# Patient Record
Sex: MALE | Race: WHITE | URBAN - METROPOLITAN AREA
[De-identification: names, ages, dates, MRNs, and addresses within clinical notes are randomized per-mention and may not be internally consistent; named-entity substitution may affect disease eponyms.]

---

## 2017-01-06 ENCOUNTER — TELEPHONE (OUTPATIENT)
Dept: CARDIOLOGY CLINIC | Age: 52
End: 2017-01-06

## 2017-01-06 RX ORDER — METOPROLOL SUCCINATE 25 MG/1
75 TABLET, EXTENDED RELEASE ORAL DAILY
COMMUNITY
End: 2017-01-06 | Stop reason: SDUPTHER

## 2017-01-06 RX ORDER — METOPROLOL SUCCINATE 25 MG/1
75 TABLET, EXTENDED RELEASE ORAL DAILY
Qty: 90 TABLET | Refills: 5 | Status: SHIPPED | OUTPATIENT
Start: 2017-01-06 | End: 2017-07-10 | Stop reason: SDUPTHER

## 2017-06-19 ENCOUNTER — OFFICE VISIT (OUTPATIENT)
Dept: CARDIOLOGY CLINIC | Age: 52
End: 2017-06-19

## 2017-06-19 VITALS
WEIGHT: 224 LBS | OXYGEN SATURATION: 98 % | SYSTOLIC BLOOD PRESSURE: 130 MMHG | DIASTOLIC BLOOD PRESSURE: 80 MMHG | BODY MASS INDEX: 31.36 KG/M2 | HEIGHT: 71 IN | HEART RATE: 70 BPM

## 2017-06-19 DIAGNOSIS — I49.3 PVC'S (PREMATURE VENTRICULAR CONTRACTIONS): Primary | ICD-10-CM

## 2017-06-19 DIAGNOSIS — E11.9 TYPE 2 DIABETES MELLITUS WITHOUT COMPLICATION, WITHOUT LONG-TERM CURRENT USE OF INSULIN (HCC): ICD-10-CM

## 2017-06-19 DIAGNOSIS — R00.2 PALPITATIONS: ICD-10-CM

## 2017-06-19 DIAGNOSIS — E78.00 PURE HYPERCHOLESTEROLEMIA: ICD-10-CM

## 2017-06-19 DIAGNOSIS — I10 BENIGN ESSENTIAL HTN: ICD-10-CM

## 2017-06-19 PROCEDURE — 99214 OFFICE O/P EST MOD 30 MIN: CPT | Performed by: INTERNAL MEDICINE

## 2017-06-19 RX ORDER — ATORVASTATIN CALCIUM 40 MG/1
40 TABLET, FILM COATED ORAL EVERY EVENING
COMMUNITY
Start: 2017-05-31

## 2017-06-19 ASSESSMENT — ENCOUNTER SYMPTOMS
WHEEZING: 0
SHORTNESS OF BREATH: 0
ABDOMINAL DISTENTION: 0
BLOOD IN STOOL: 0
EYE REDNESS: 0
COUGH: 0

## 2017-07-10 RX ORDER — METOPROLOL SUCCINATE 25 MG/1
TABLET, EXTENDED RELEASE ORAL
Qty: 90 TABLET | Refills: 5 | Status: SHIPPED | OUTPATIENT
Start: 2017-07-10 | End: 2018-01-10 | Stop reason: SDUPTHER

## 2018-01-11 RX ORDER — METOPROLOL SUCCINATE 25 MG/1
TABLET, EXTENDED RELEASE ORAL
Qty: 180 TABLET | Refills: 3 | Status: SHIPPED | OUTPATIENT
Start: 2018-01-11 | End: 2018-01-22

## 2018-01-22 ENCOUNTER — OFFICE VISIT (OUTPATIENT)
Dept: CARDIOLOGY CLINIC | Age: 53
End: 2018-01-22

## 2018-01-22 VITALS
DIASTOLIC BLOOD PRESSURE: 100 MMHG | SYSTOLIC BLOOD PRESSURE: 142 MMHG | HEART RATE: 68 BPM | OXYGEN SATURATION: 96 % | WEIGHT: 217 LBS | HEIGHT: 70 IN | BODY MASS INDEX: 31.07 KG/M2

## 2018-01-22 DIAGNOSIS — R00.2 PALPITATIONS: Primary | ICD-10-CM

## 2018-01-22 DIAGNOSIS — I49.3 PVC'S (PREMATURE VENTRICULAR CONTRACTIONS): ICD-10-CM

## 2018-01-22 DIAGNOSIS — E78.00 PURE HYPERCHOLESTEROLEMIA: ICD-10-CM

## 2018-01-22 PROCEDURE — 99214 OFFICE O/P EST MOD 30 MIN: CPT | Performed by: INTERNAL MEDICINE

## 2018-01-22 RX ORDER — METOPROLOL SUCCINATE 100 MG/1
100 TABLET, EXTENDED RELEASE ORAL DAILY
Qty: 30 TABLET | Refills: 6 | Status: SHIPPED | OUTPATIENT
Start: 2018-01-22 | End: 2018-08-06 | Stop reason: SDUPTHER

## 2018-01-22 RX ORDER — LISINOPRIL 5 MG/1
5 TABLET ORAL DAILY
Qty: 30 TABLET | Refills: 3 | Status: CANCELLED | OUTPATIENT
Start: 2018-01-22

## 2018-01-22 ASSESSMENT — ENCOUNTER SYMPTOMS
BLOOD IN STOOL: 0
ABDOMINAL DISTENTION: 0
SHORTNESS OF BREATH: 1
COUGH: 0
EYE REDNESS: 0
WHEEZING: 0

## 2018-01-22 NOTE — PATIENT INSTRUCTIONS
your doctor if you think you are having a problem with your medicine. When should you call for help? Call 911 anytime you think you may need emergency care. For example, call if:  ? · You passed out (lost consciousness). ? · You have symptoms of a heart attack. These may include:  ¨ Chest pain or pressure, or a strange feeling in the chest.  ¨ Sweating. ¨ Shortness of breath. ¨ Pain, pressure, or a strange feeling in the back, neck, jaw, or upper belly or in one or both shoulders or arms. ¨ Lightheadedness or sudden weakness. ¨ A fast or irregular heartbeat. After you call 911, the  may tell you to chew 1 adult-strength or 2 to 4 low-dose aspirin. Wait for an ambulance. Do not try to drive yourself. ? · You have symptoms of a stroke. These may include:  ¨ Sudden numbness, tingling, weakness, or loss of movement in your face, arm, or leg, especially on only one side of your body. ¨ Sudden vision changes. ¨ Sudden trouble speaking. ¨ Sudden confusion or trouble understanding simple statements. ¨ Sudden problems with walking or balance. ¨ A sudden, severe headache that is different from past headaches. ?Call your doctor now or seek immediate medical care if:  ? · You have heart palpitations and:  ¨ Are dizzy or lightheaded, or you feel like you may faint. ¨ Have new or increased shortness of breath. ? Watch closely for changes in your health, and be sure to contact your doctor if:  ? · You continue to have heart palpitations. Where can you learn more? Go to https://TATE'S LISTgeorge.Pijon. org and sign in to your Whotever account. Enter R508 in the [facility] box to learn more about \"Palpitations: Care Instructions. \"     If you do not have an account, please click on the \"Sign Up Now\" link. Current as of: September 21, 2016  Content Version: 11.5  © 9585-2030 Healthwise, Incorporated. Care instructions adapted under license by [facility] ([facility]).  If you have questions about a

## 2018-01-22 NOTE — PROGRESS NOTES
Subjective:      Patient ID: Corbin Roman is a 47 y.o. male. Reason for visit: f/u palpitations  CC: \"I have palpitations and SOB\"    HPI Corbin Roman denies chest pain, dizziness, syncope, leg swelling, worsening BLAIR and cough. He reports worsening palpitations in the afternoons. Review of Systems   Constitutional: Negative for activity change, appetite change, chills, fatigue, fever and unexpected weight change. HENT: Negative for congestion, nosebleeds and tinnitus. Eyes: Negative for redness and visual disturbance. Respiratory: Positive for shortness of breath. Negative for cough and wheezing. Cardiovascular: Positive for palpitations. Negative for chest pain and leg swelling. Gastrointestinal: Negative for abdominal distention and blood in stool. Genitourinary: Negative for dysuria and hematuria. Musculoskeletal: Negative for gait problem and myalgias. Neurological: Negative for dizziness and speech difficulty. Hematological: Does not bruise/bleed easily. Psychiatric/Behavioral: Negative for behavioral problems and confusion. All other systems reviewed and are negative. Objective:   Physical Exam   Constitutional: He is oriented to person, place, and time. He appears well-developed and well-nourished. HENT:   Head: Normocephalic and atraumatic. Eyes: Conjunctivae are normal. Right eye exhibits no discharge. Left eye exhibits no discharge. Neck: Normal range of motion. Neck supple. Cardiovascular: Normal rate, regular rhythm, normal heart sounds and intact distal pulses. ectopy   Pulmonary/Chest: Effort normal and breath sounds normal.   Abdominal: Soft. Bowel sounds are normal.   Musculoskeletal: Normal range of motion. He exhibits no edema. Neurological: He is alert and oriented to person, place, and time. Skin: Skin is warm and dry. Psychiatric: He has a normal mood and affect. His behavior is normal.   Nursing note and vitals reviewed.     Blood pressure Remigio Wooten ) 142/100, pulse 68, height 5' 10\" (1.778 m), weight 217 lb (98.4 kg), SpO2 96 %. Vitals:    01/22/18 1456   BP: (!) 142/100   Pulse: 68   SpO2: 96%   Weight: 217 lb (98.4 kg)   Height: 5' 10\" (1.778 m)     Body mass index is 31.14 kg/m². Wt Readings from Last 3 Encounters:   01/22/18 217 lb (98.4 kg)   06/19/17 224 lb (101.6 kg)   12/12/16 218 lb (98.9 kg)     BP Readings from Last 3 Encounters:   01/22/18 (!) 142/100   06/19/17 130/80   12/12/16 130/88        Current Outpatient Prescriptions   Medication Sig Dispense Refill    Dapagliflozin-Metformin HCl ER (XIGDUO XR)  MG TB24 Take by mouth      metoprolol succinate (TOPROL XL) 25 MG extended release tablet Take 2 tablets by mouth every morning and 1 tablet every evening 180 tablet 3    atorvastatin (LIPITOR) 40 MG tablet Take 40 mg by mouth every evening      metFORMIN (GLUCOPHAGE-XR) 750 MG extended release tablet Take 750 mg by mouth daily (with breakfast)       No current facility-administered medications for this visit. Social History     Social History    Marital status:      Spouse name: N/A    Number of children: N/A    Years of education: N/A     Social History Main Topics    Smoking status: Former Smoker     Packs/day: 0.25     Years: 2.00     Types: Cigarettes     Quit date: 10/25/2009    Smokeless tobacco: Never Used    Alcohol use No    Drug use: No    Sexual activity: Not Asked     Other Topics Concern    None     Social History Narrative    None     History reviewed. No pertinent surgical history. No Known Allergies  Family History   Problem Relation Age of Onset    Cancer Mother     High Blood Pressure Maternal Aunt     Diabetes Maternal Aunt        Recent labs and imaging reviewed. Assessment:            Neck pain associated with palpitations. Plain GXT showed no ischemia.       Diabetes ([address]- managed by PCP     Erectile dysfunction     HLD, , managed by PCP     Palpitations      GXT 10/11/16 no ischemia, isolated symptomatic PAC's and PVC's. Event recorder 11/2016 NSR, PVC's and PAC's- isolated which are symptomatic. K+ level normal. Taking BB. HTN - Uncontrolled. GERD. Hx          Plan:      In view of palpitations and uncontrolled HTN, will changed to Toprol XL 100mg daily and continue to monitor BP. Consider ACE in view of DM. No angina or clinical evidence of CHF.

## 2018-08-03 ASSESSMENT — ENCOUNTER SYMPTOMS
BLOOD IN STOOL: 0
ABDOMINAL DISTENTION: 0
EYE REDNESS: 0
WHEEZING: 0
SHORTNESS OF BREATH: 0
COUGH: 0

## 2018-08-03 NOTE — PROGRESS NOTES
Hx    Plan    Clinically stable. HTN controlled. Palpitations improved. No evidence of CHF. No angina. Continue statin and BB. Risk factor modification also discussed. Return in about 1 year (around 8/6/2019).

## 2018-08-06 ENCOUNTER — OFFICE VISIT (OUTPATIENT)
Dept: CARDIOLOGY CLINIC | Age: 53
End: 2018-08-06

## 2018-08-06 VITALS
OXYGEN SATURATION: 97 % | DIASTOLIC BLOOD PRESSURE: 82 MMHG | HEART RATE: 64 BPM | BODY MASS INDEX: 29.78 KG/M2 | WEIGHT: 208 LBS | HEIGHT: 70 IN | SYSTOLIC BLOOD PRESSURE: 116 MMHG

## 2018-08-06 DIAGNOSIS — I49.3 PVC'S (PREMATURE VENTRICULAR CONTRACTIONS): Primary | ICD-10-CM

## 2018-08-06 DIAGNOSIS — R00.2 PALPITATIONS: ICD-10-CM

## 2018-08-06 DIAGNOSIS — E78.00 PURE HYPERCHOLESTEROLEMIA: ICD-10-CM

## 2018-08-06 PROCEDURE — 99214 OFFICE O/P EST MOD 30 MIN: CPT | Performed by: INTERNAL MEDICINE

## 2018-08-06 RX ORDER — METOPROLOL SUCCINATE 100 MG/1
100 TABLET, EXTENDED RELEASE ORAL DAILY
Qty: 90 TABLET | Refills: 3 | Status: SHIPPED | OUTPATIENT
Start: 2018-08-06 | End: 2018-08-30 | Stop reason: SDUPTHER

## 2018-08-06 NOTE — COMMUNICATION BODY
Assessment:       Plan:     HPI Jairo Proctor denies chest pain, dizziness, syncope, leg swelling and increased dyspnea. He is exercising regularly and watching his diet. He has only rare palpitations. Assessment          Neck pain associated with palpitations. Plain GXT showed no ischemia.  Diabetes ([facility] [address]- managed by PCP     Erectile dysfunction     HLD, LDL 78 5/2018, managed by PCP     Palpitations      GXT 10/11/16 no ischemia, isolated symptomatic PAC's and PVC's. Event recorder 11/2016 NSR, PVC's and PAC's- isolated which are symptomatic. K+ level normal.         HTN - controlled. GERD. Hx    Plan    Clinically stable. HTN controlled. Palpitations improved. No evidence of CHF. No angina. Continue statin and BB. Risk factor modification also discussed. Return in about 1 year (around 8/6/2019).

## 2018-08-06 NOTE — LETTER
[address] Cardiology - [address] [address]  [address]   [address] [address]  Phone: [phone]  Fax: [phone]    Nelson Reese MD        August 6, 2018     Jorge Langston MD  [facility], [address]  [address] [facility]    Patient: Dayton Fischer  MR Number: [id number]  YOB: 1965  Date of Visit: 8/6/2018    Dear Dr. Jorge Langston:    HPI Dayton Fischer denies chest pain, dizziness, syncope, leg swelling and increased dyspnea. He is exercising regularly and watching his diet. He has only rare palpitations. Assessment          Neck pain associated with palpitations. Plain GXT showed no ischemia.  Diabetes ([address]- managed by PCP     Erectile dysfunction     HLD, LDL 78 5/2018, managed by PCP     Palpitations      GXT 10/11/16 no ischemia, isolated symptomatic PAC's and PVC's. Event recorder 11/2016 NSR, PVC's and PAC's- isolated which are symptomatic. K+ level normal.         HTN - controlled. GERD. Hx    Plan    Clinically stable. HTN controlled. Palpitations improved. No evidence of CHF. No angina. Continue statin and BB. Risk factor modification also discussed. Return in about 1 year (around 8/6/2019). If you have questions, please do not hesitate to call me. I look forward to following Blaze along with you.     Sincerely,        Nelson Reese MD

## 2018-08-30 RX ORDER — METOPROLOL SUCCINATE 100 MG/1
100 TABLET, EXTENDED RELEASE ORAL DAILY
Qty: 90 TABLET | Refills: 3 | Status: SHIPPED | OUTPATIENT
Start: 2018-08-30 | End: 2019-08-07 | Stop reason: SDUPTHER

## 2019-08-07 RX ORDER — METOPROLOL SUCCINATE 100 MG/1
100 TABLET, EXTENDED RELEASE ORAL DAILY
Qty: 90 TABLET | Refills: 0 | Status: SHIPPED | OUTPATIENT
Start: 2019-08-07 | End: 2019-11-13 | Stop reason: SDUPTHER

## 2019-11-13 RX ORDER — METOPROLOL SUCCINATE 100 MG/1
100 TABLET, EXTENDED RELEASE ORAL DAILY
Qty: 30 TABLET | Refills: 0 | Status: SHIPPED | OUTPATIENT
Start: 2019-11-13 | End: 2019-12-18 | Stop reason: SDUPTHER

## 2019-12-19 RX ORDER — METOPROLOL SUCCINATE 100 MG/1
100 TABLET, EXTENDED RELEASE ORAL DAILY
Qty: 90 TABLET | Refills: 3 | Status: SHIPPED | OUTPATIENT
Start: 2019-12-19 | End: 2020-01-31 | Stop reason: SDUPTHER

## 2019-12-19 RX ORDER — METOPROLOL SUCCINATE 100 MG/1
100 TABLET, EXTENDED RELEASE ORAL DAILY
Qty: 30 TABLET | Refills: 0 | Status: SHIPPED | OUTPATIENT
Start: 2019-12-19 | End: 2019-12-19 | Stop reason: SDUPTHER

## 2020-01-09 ENCOUNTER — OFFICE VISIT (OUTPATIENT)
Dept: CARDIOLOGY CLINIC | Age: 55
End: 2020-01-09
Payer: COMMERCIAL

## 2020-01-09 VITALS
BODY MASS INDEX: 30.09 KG/M2 | SYSTOLIC BLOOD PRESSURE: 150 MMHG | DIASTOLIC BLOOD PRESSURE: 95 MMHG | HEIGHT: 70 IN | OXYGEN SATURATION: 98 % | WEIGHT: 210.2 LBS | HEART RATE: 64 BPM

## 2020-01-09 PROCEDURE — 93000 ELECTROCARDIOGRAM COMPLETE: CPT | Performed by: INTERNAL MEDICINE

## 2020-01-09 PROCEDURE — 99214 OFFICE O/P EST MOD 30 MIN: CPT | Performed by: INTERNAL MEDICINE

## 2020-01-09 RX ORDER — LOSARTAN POTASSIUM 25 MG/1
25 TABLET ORAL DAILY
Qty: 90 TABLET | Refills: 3 | Status: SHIPPED | OUTPATIENT
Start: 2020-01-09 | End: 2020-12-30 | Stop reason: SDUPTHER

## 2020-01-09 ASSESSMENT — ENCOUNTER SYMPTOMS
COUGH: 0
SHORTNESS OF BREATH: 0
BLOOD IN STOOL: 0
ABDOMINAL DISTENTION: 0
WHEEZING: 0
EYE REDNESS: 0

## 2020-01-09 NOTE — PROGRESS NOTES
Kaya Basurto is a 49 y.o. male    Reason for Visit: f/u palpitations    HPI Kaya Basurto denies chest pain, dizziness, syncope, worsening right leg swelling and increased dyspnea. He rarely has palpitations. He states that his BP was high at the dentist today. Review of Systems   Constitutional: Negative for activity change, appetite change, chills, fatigue, fever and unexpected weight change. HENT: Negative for congestion, nosebleeds and tinnitus. Eyes: Negative for redness and visual disturbance. Respiratory: Negative for cough, shortness of breath and wheezing. Cardiovascular: Negative for chest pain, palpitations and leg swelling. Gastrointestinal: Negative for abdominal distention and blood in stool. Genitourinary: Negative for dysuria and hematuria. Musculoskeletal: Negative for gait problem and myalgias. Neurological: Negative for dizziness and speech difficulty. Hematological: Does not bruise/bleed easily. Psychiatric/Behavioral: Negative for behavioral problems and confusion. All other systems reviewed and are negative. Physical Exam   Constitutional: He is oriented to person, place, and time. He appears well-developed and well-nourished. HENT:   Head: Normocephalic and atraumatic. Eyes: Conjunctivae are normal. Right eye exhibits no discharge. Left eye exhibits no discharge. Neck: Normal range of motion. Neck supple. Cardiovascular: Normal rate, regular rhythm, normal heart sounds and intact distal pulses. Pulmonary/Chest: Effort normal and breath sounds normal.   Abdominal: Soft. Bowel sounds are normal.   Musculoskeletal: Normal range of motion. General: Edema (mild RLE) present. Neurological: He is alert and oriented to person, place, and time. Skin: Skin is warm and dry. Psychiatric: He has a normal mood and affect. His behavior is normal.   Nursing note and vitals reviewed.       Wt Readings from Last 3 Encounters:   01/09/20 210 lb 3.2 oz current or ex partner: None     Emotionally abused: None     Physically abused: None     Forced sexual activity: None   Other Topics Concern    None   Social History Narrative    None       History reviewed. No pertinent surgical history. Family History   Problem Relation Age of Onset    Cancer Mother     High Blood Pressure Maternal Aunt     Diabetes Maternal Aunt        No Known Allergies    Recent Labs and Imaging reviewed    Assessment          Neck pain associated with palpitations. Plain GXT showed no ischemia.  Diabetes ([address]- managed by PCP     Erectile dysfunction     HLD, LDL 78 5/2018, managed by PCP     Palpitations      GXT 10/11/16 no ischemia, isolated symptomatic PAC's and PVC's. Event recorder 11/2016 NSR, PVC's and PAC's- isolated which are symptomatic. K+ level normal. EKG 1/2020 SB. HTN - uncontrolled. GERD. Hx    Plan     No evidence of CHF. No angina. In view of HTN and DM, will add cozaar 25mg daily. BMP in 1 month. EKG today confirms SB. Continue BB in view of palpitations. Ongoing risk factor modification also discussed. Return in about 1 year (around 1/9/2021). This note was scribed in the presence of the physician by Mark Tarango RN. The scribes documentation has been prepared under my direction and personally reviewed by me in its entirety. I confirm that the note above accurately reflects all work, treatment, procedures, and medical decision making performed by me.

## 2020-01-09 NOTE — LETTER
[address] [facility]iology [facility]  [address]  Phone: [phone]  Fax: [phone]    Aishwarya Dasilva MD        January 24, 2020     Dustin Duke MD  [facility], [address]  [address] [facility]    Patient: Stuart Holland  MR Number: <[id number]>  YOB: 1965  Date of Visit: 1/9/2020    Dear Dr. Dustin Duke: Thank you for your referral. Progress note attached in visit summary. If you have questions, please do not hesitate to call me. I look forward to following Blaze along with you.     Sincerely,        Aishwarya Dasilva MD

## 2020-01-09 NOTE — PATIENT INSTRUCTIONS
Patient Education      START COZAAR 25MG DAILY  High Blood Pressure: Care Instructions  Overview    It's normal for blood pressure to go up and down throughout the day. But if it stays up, you have high blood pressure. Another name for high blood pressure is hypertension. Despite what a lot of people think, high blood pressure usually doesn't cause headaches or make you feel dizzy or lightheaded. It usually has no symptoms. But it does increase your risk of stroke, heart attack, and other problems. You and your doctor will talk about your risks of these problems based on your blood pressure. Your doctor will give you a goal for your blood pressure. Your goal will be based on your health and your age. Lifestyle changes, such as eating healthy and being active, are always important to help lower blood pressure. You might also take medicine to reach your blood pressure goal.  Follow-up care is a key part of your treatment and safety. Be sure to make and go to all appointments, and call your doctor if you are having problems. It's also a good idea to know your test results and keep a list of the medicines you take. How can you care for yourself at home? Medical treatment  · If you stop taking your medicine, your blood pressure will go back up. You may take one or more types of medicine to lower your blood pressure. Be safe with medicines. Take your medicine exactly as prescribed. Call your doctor if you think you are having a problem with your medicine. · Talk to your doctor before you start taking aspirin every day. Aspirin can help certain people lower their risk of a heart attack or stroke. But taking aspirin isn't right for everyone, because it can cause serious bleeding. · See your doctor regularly. You may need to see the doctor more often at first or until your blood pressure comes down.   · If you are taking blood pressure medicine, talk to your doctor before you take decongestants or anti-inflammatory weakness. ? A fast or irregular heartbeat.     · You have symptoms of a stroke. These may include:  ? Sudden numbness, tingling, weakness, or loss of movement in your face, arm, or leg, especially on only one side of your body. ? Sudden vision changes. ? Sudden trouble speaking. ? Sudden confusion or trouble understanding simple statements. ? Sudden problems with walking or balance. ? A sudden, severe headache that is different from past headaches.     · You have severe back or belly pain.    Do not wait until your blood pressure comes down on its own. Get help right away.   Call your doctor now or seek immediate care if:    · Your blood pressure is much higher than normal (such as 180/120 or higher), but you don't have symptoms.     · You think high blood pressure is causing symptoms, such as:  ? Severe headache.  ? Blurry vision.    Watch closely for changes in your health, and be sure to contact your doctor if:    · Your blood pressure measures higher than your doctor recommends at least 2 times. That means the top number is higher or the bottom number is higher, or both.     · You think you may be having side effects from your blood pressure medicine. Where can you learn more? Go to https://Sigma Force.Gameyola. org and sign in to your UserMojo account. Enter V419 in the [employer] box to learn more about \"High Blood Pressure: Care Instructions. \"     If you do not have an account, please click on the \"Sign Up Now\" link. Current as of: April 9, 2019  Content Version: 12.3  © 1493-5025 Healthwise, Incorporated. Care instructions adapted under license by [facility] [employer] [facility] ([facility]). If you have questions about a medical condition or this instruction, always ask your healthcare professional. [place] [address] any warranty or liability for your use of this information.

## 2020-01-31 RX ORDER — METOPROLOL SUCCINATE 100 MG/1
100 TABLET, EXTENDED RELEASE ORAL DAILY
Qty: 90 TABLET | Refills: 3 | Status: SHIPPED | OUTPATIENT
Start: 2020-01-31 | End: 2021-02-22 | Stop reason: SDUPTHER

## 2020-12-30 RX ORDER — LOSARTAN POTASSIUM 25 MG/1
25 TABLET ORAL DAILY
Qty: 90 TABLET | Refills: 3 | Status: SHIPPED | OUTPATIENT
Start: 2020-12-30 | End: 2022-01-27 | Stop reason: SDUPTHER

## 2021-01-08 PROBLEM — I10 ESSENTIAL HYPERTENSION: Status: ACTIVE | Noted: 2021-01-08

## 2021-01-08 NOTE — PROGRESS NOTES
[address]      Cardiology Consult    Bibiana Bush  1965    Primary Physician: Lazarus Deluna   Reason for visit:            Yearly f/u for palpitations, HTN    HPI:  The patient is 48 y.o. male with a past medical history significant for palpitations secondary to symptomatic PAC's and PVC's, DM, HTN, HLD, ED and GERD. Past Medical History:   Diagnosis Date    [address] [address]     Erectile dysfunction     History of kidney stones     Palpitations     GXT 10/11/16 no ischemia, isolated symptomatic PAC's and PVC's. No past surgical history on file. Family History   Problem Relation Age of Onset    Cancer Mother     High Blood Pressure Maternal Aunt     Diabetes Maternal Aunt      Social History     Tobacco Use    Smoking status: Former Smoker     Packs/day: 0.25     Years: 2.00     Pack years: 0.50     Types: Cigarettes     Quit date: 10/25/2009     Years since quittin.2    Smokeless tobacco: Never Used   Substance Use Topics    Alcohol use: Yes     Comment: occ.  Drug use: No       No Known Allergies  Current Outpatient Medications   Medication Sig Dispense Refill    losartan (COZAAR) 25 MG tablet Take 1 tablet by mouth daily 90 tablet 3    metoprolol succinate (TOPROL XL) 100 MG extended release tablet Take 1 tablet by mouth daily 90 tablet 3    Dapagliflozin-Metformin HCl ER (XIGDUO XR)  MG TB24 Take by mouth      atorvastatin (LIPITOR) 40 MG tablet Take 40 mg by mouth every evening       No current facility-administered medications for this visit. Review of Systems:  · Constitutional: no unanticipated weight loss. There's been no change in energy level, sleep pattern, or activity level. No fevers, chills. · Eyes: No visual changes or diplopia. No scleral icterus. · ENT: No Headaches, hearing loss or vertigo. No mouth sores or sore throat.   · Cardiovascular: as reviewed in HPI · Respiratory: No cough or wheezing, no sputum production. No hematemesis. · Gastrointestinal: No abdominal pain, appetite loss, blood in stools. No change in bowel or bladder habits. · Genitourinary: No dysuria, trouble voiding, or hematuria. · Musculoskeletal:  No gait disturbance, no joint complaints. · Integumentary: No rash or pruritis. · Neurological: No headache, diplopia, change in muscle strength, numbness or tingling. · Psychiatric: No anxiety or depression. · Endocrine: No temperature intolerance. No excessive thirst, fluid intake, or urination. No tremor. · Hematologic/Lymphatic: No abnormal bruising or bleeding, blood clots or swollen lymph nodes. · Allergic/Immunologic: No nasal congestion or hives. Physical Exam:   There were no vitals taken for this visit. Wt Readings from Last 3 Encounters:   01/09/20 210 lb 3.2 oz (95.3 kg)   08/06/18 208 lb (94.3 kg)   01/22/18 217 lb (98.4 kg)     +++++++++++++++++++  Constitutional: He is oriented to person, place, and time. He appears well-developed and well-nourished. In no acute distress. Head: Normocephalic and atraumatic. Pupils equal and round. Neck: Neck supple. No JVP or carotid bruit appreciated. No mass and no thyromegaly present. No lymphadenopathy present. Cardiovascular: Normal rate. Normal heart sounds. Exam reveals no gallop and no friction rub. No murmur heard. Pulmonary/Chest: Effort normal and breath sounds normal. No respiratory distress. He has no wheezes, rhonchi or rales. Abdominal: Soft, non-tender. Bowel sounds are normal. He exhibits no organomegaly, mass or bruit. Extremities: No edema, cyanosis, or clubbing. Pulses are 2+ radial/dorsalis pedis/posterior tibial/carotid bilaterally. Neurological: No gross cranial nerve deficit. Coordination normal.   Skin: Skin is warm and dry. There is no rash or diaphoresis. Psychiatric: He has a normal mood and affect.  His speech is normal and behavior is normal.

## 2021-01-18 ENCOUNTER — OFFICE VISIT (OUTPATIENT)
Dept: CARDIOLOGY CLINIC | Age: 56
End: 2021-01-18
Payer: COMMERCIAL

## 2021-01-18 VITALS
SYSTOLIC BLOOD PRESSURE: 130 MMHG | DIASTOLIC BLOOD PRESSURE: 80 MMHG | TEMPERATURE: 97.1 F | WEIGHT: 207 LBS | HEART RATE: 74 BPM | HEIGHT: 70 IN | OXYGEN SATURATION: 98 % | BODY MASS INDEX: 29.63 KG/M2

## 2021-01-18 DIAGNOSIS — E78.2 MIXED HYPERLIPIDEMIA: ICD-10-CM

## 2021-01-18 DIAGNOSIS — R00.2 PALPITATIONS: Primary | ICD-10-CM

## 2021-01-18 DIAGNOSIS — I49.3 PVC'S (PREMATURE VENTRICULAR CONTRACTIONS): ICD-10-CM

## 2021-01-18 DIAGNOSIS — I10 ESSENTIAL HYPERTENSION: ICD-10-CM

## 2021-01-18 PROCEDURE — 99213 OFFICE O/P EST LOW 20 MIN: CPT | Performed by: INTERNAL MEDICINE

## 2021-01-18 RX ORDER — EMPAGLIFLOZIN, METFORMIN HYDROCHLORIDE 25; 1000 MG/1; MG/1
1 TABLET, EXTENDED RELEASE ORAL DAILY
COMMUNITY

## 2021-01-18 NOTE — PROGRESS NOTES
[facility]      Cardiology Consult    Cristian Player  1965    Primary Physician: Ronal Petty   Reason for visit:  Yearly f/u for palpitations, HTN    HPI:  The patient is 48 y.o. male with a past medical history significant for palpitations secondary to symptomatic PAC's and PVC's, DM, HTN, HLD, ED and GERD. Today, he is here for routine follow up. He says that he does not notice the irregular heart rate as often. He denies any new cardiac symptoms. Patient denies exertional chest pain/pressure, dyspnea at rest, BLAIR, PND, orthopnea, lightheadedness, weight changes, changes in LE edema, and syncope. Patient reports compliance to his medications. Past Medical History:   Diagnosis Date    Diabetes ([address]     Erectile dysfunction     History of kidney stones     Hyperlipidemia     Hypertension     Palpitations     GXT 10/11/16 no ischemia, isolated symptomatic PAC's and PVC's. History reviewed. No pertinent surgical history. Family History   Problem Relation Age of Onset    Cancer Mother     High Blood Pressure Maternal Aunt     Diabetes Maternal Aunt      Social History     Tobacco Use    Smoking status: Former Smoker     Packs/day: 0.25     Years: 2.00     Pack years: 0.50     Types: Cigarettes     Quit date: 10/25/2009     Years since quittin.2    Smokeless tobacco: Never Used   Substance Use Topics    Alcohol use: Yes     Comment: occ.      Drug use: No       No Known Allergies  Current Outpatient Medications   Medication Sig Dispense Refill    Empagliflozin-metFORMIN HCl ER (SYNJARDY XR)  MG TB24 Take 1 tablet by mouth daily      NONFORMULARY cbd oil      losartan (COZAAR) 25 MG tablet Take 1 tablet by mouth daily 90 tablet 3    metoprolol succinate (TOPROL XL) 100 MG extended release tablet Take 1 tablet by mouth daily 90 tablet 3    atorvastatin (LIPITOR) 40 MG tablet Take 40 mg by mouth every evening       No current facility-administered medications for this visit. Review of Systems:  · Constitutional: no unanticipated weight loss. There's been no change in energy level, sleep pattern, or activity level. No fevers, chills. · Eyes: No visual changes or diplopia. No scleral icterus. · ENT: No Headaches, hearing loss or vertigo. No mouth sores or sore throat. · Cardiovascular: as reviewed in HPI  · Respiratory: No cough or wheezing, no sputum production. No hematemesis. · Gastrointestinal: No abdominal pain, appetite loss, blood in stools. No change in bowel or bladder habits. · Genitourinary: No dysuria, trouble voiding, or hematuria. · Musculoskeletal:  No gait disturbance, no joint complaints. · Integumentary: No rash or pruritis. · Neurological: No headache, diplopia, change in muscle strength, numbness or tingling. · Psychiatric: No anxiety or depression. · Endocrine: No temperature intolerance. No excessive thirst, fluid intake, or urination. No tremor. · Hematologic/Lymphatic: No abnormal bruising or bleeding, blood clots or swollen lymph nodes. · Allergic/Immunologic: No nasal congestion or hives. Physical Exam:   /80 (Site: Right Upper Arm, Position: Sitting, Cuff Size: Medium Adult)   Pulse 74   Temp 97.1 °F (36.2 °C)   Ht 5' 10\" (1.778 m)   Wt 207 lb (93.9 kg)   SpO2 98%   BMI 29.70 kg/m²   Wt Readings from Last 3 Encounters:   01/18/21 207 lb (93.9 kg)   01/09/20 210 lb 3.2 oz (95.3 kg)   08/06/18 208 lb (94.3 kg)     Constitutional: He is oriented to person, place, and time. He appears well-developed and well-nourished. In no acute distress. Head: Normocephalic and atraumatic. Pupils equal and round. Neck: Neck supple. No JVP or carotid bruit appreciated. No mass and no thyromegaly present. No lymphadenopathy present. Cardiovascular: Normal rate. Normal heart sounds. Exam reveals no gallop and no friction rub. No murmur heard.   Pulmonary/Chest: Effort normal and breath sounds normal. No respiratory distress. He has no wheezes, rhonchi or rales. Abdominal: Soft, non-tender. Bowel sounds are normal. He exhibits no organomegaly, mass or bruit. Extremities: No edema, cyanosis, or clubbing. Pulses are 2+ radial/dorsalis pedis/posterior tibial/carotid bilaterally. Neurological: No gross cranial nerve deficit. Coordination normal.   Skin: Skin is warm and dry. There is no rash or diaphoresis. Psychiatric: He has a normal mood and affect. His speech is normal and behavior is normal.     Lab Review:   FLP:  No results found for: TRIG, HDL, LDLCALC, LDLDIRECT, LABVLDL  BUN/Creatinine:  No results found for: BUN, CREATININE    EKG Interpretation:   EKG 1/2020 SB. Event recorder 11/2016 NSR, PVC's and PAC's- isolated which are symptomatic. Image Review:   GXT 10/11/16 no ischemia, isolated symptomatic PAC's and PVC's    Assessment/Plan:     Hypertension  Controlled. Continue current medical management. Symptomatic PAC's/PVC's  Feels better on current dose of B-blocker. Will continue     Hyperlipidemia  Continue statin therapy. Recommended yearly  lipid profile     Follow up in 8 months. Thank you very much for allowing me to participate in the care of your patient. Please do not hesitate to contact me if you have any questions. Sincerely,  Quan Carey MD      [facility], [address] [place] [address]  Ph: [phone]  Fax: [phone]    This note was scribed in the presence of Dr Celine Iniguez, by Terry Yang RN  Physician Attestation:  The scribes documentation has been prepared under my direction and personally reviewed by me in its entirety. I confirm that the note above accurately reflects all work, treatment, procedures, and medical decision making performed by me.

## 2021-01-18 NOTE — PATIENT INSTRUCTIONS
Patient Education        Heart-Healthy Diet: Care Instructions  Your Care Instructions     A heart-healthy diet has lots of vegetables, fruits, nuts, beans, and whole grains, and is low in salt. It limits foods that are high in saturated fat, such as meats, cheeses, and fried foods. It may be hard to change your diet, but even small changes can lower your risk of heart attack and heart disease. Follow-up care is a key part of your treatment and safety. Be sure to make and go to all appointments, and call your doctor if you are having problems. It's also a good idea to know your test results and keep a list of the medicines you take. How can you care for yourself at home? Watch your portions  · Learn what a serving is. A \"serving\" and a \"portion\" are not always the same thing. Make sure that you are not eating larger portions than are recommended. For example, a serving of pasta is ½ cup. A serving size of meat is 2 to 3 ounces. A 3-ounce serving is about the size of a deck of cards. Measure serving sizes until you are good at Glenn" them. Keep in mind that restaurants often serve portions that are 2 or 3 times the size of one serving. · To keep your energy level up and keep you from feeling hungry, eat often but in smaller portions. · Eat only the number of calories you need to stay at a healthy weight. If you need to lose weight, eat fewer calories than your body burns (through exercise and other physical activity). Eat more fruits and vegetables  · Eat a variety of fruit and vegetables every day. Dark green, deep orange, red, or yellow fruits and vegetables are especially good for you. Examples include spinach, carrots, peaches, and berries. · Keep carrots, celery, and other veggies handy for snacks. Buy fruit that is in season and store it where you can see it so that you will be tempted to eat it. · Cook dishes that have a lot of veggies in them, such as stir-fries and soups. Go to https://chpepiceweb.healthPublictivity. org and sign in to your [employer] account. Enter V137 in the [employer] box to learn more about \"Heart-Healthy Diet: Care Instructions. \"     If you do not have an account, please click on the \"Sign Up Now\" link. Current as of: August 22, 2019               Content Version: 12.6  © 9227-7419 [employer]. Care instructions adapted under license by [facility] ([facility]). If you have questions about a medical condition or this instruction, always ask your healthcare professional. [place] [address] any warranty or liability for your use of this information.

## 2021-02-22 RX ORDER — METOPROLOL SUCCINATE 100 MG/1
100 TABLET, EXTENDED RELEASE ORAL DAILY
Qty: 90 TABLET | Refills: 3 | Status: SHIPPED | OUTPATIENT
Start: 2021-02-22 | End: 2022-03-21 | Stop reason: SDUPTHER

## 2021-02-22 NOTE — TELEPHONE ENCOUNTER
Pt is now following Dr. Isabel Pascual. Please refill prescription.       Medication Refill    Medication needing refilled:metoprolol succinate (TOPROL XL)       Dosage of the medication:100 MG extended release tablet       How are you taking this medication (QD, BID, TID, QID, PRN):  Take 1 tablet by mouth daily           30 or 90 day supply called in:90        Which Pharmacy are we sending the medication to?:    [address] Marnie Thomas [facility] [place] [facility], [address] [place], [place] [id number]   Phone:  [phone]  Fax:  [phone]

## 2021-04-27 NOTE — COMMUNICATION BODY
HPI Dorie Delgadillo denies chest pain, dizziness, syncope, leg swelling, worsening BLAIR and cough. He reports worsening palpitations in the afternoons. Assessment:            Neck pain associated with palpitations. Plain GXT showed no ischemia.  Diabetes ([address]- managed by PCP     Erectile dysfunction     HLD, , managed by PCP     Palpitations      GXT 10/11/16 no ischemia, isolated symptomatic PAC's and PVC's. Event recorder 11/2016 NSR, PVC's and PAC's- isolated which are symptomatic. K+ level normal. Taking BB. HTN - Uncontrolled. GERD. Hx          Plan:      In view of palpitations and uncontrolled HTN, will changed to Toprol XL 100mg daily and continue to monitor BP. Consider ACE in view of DM. No angina or clinical evidence of CHF. - - -

## 2021-09-01 PROBLEM — I49.1 PAC (PREMATURE ATRIAL CONTRACTION): Status: ACTIVE | Noted: 2021-09-01

## 2021-09-01 NOTE — PROGRESS NOTES
(COZAAR) 25 MG tablet Take 1 tablet by mouth daily 90 tablet 3    atorvastatin (LIPITOR) 40 MG tablet Take 40 mg by mouth every evening       No current facility-administered medications for this visit. Review of Systems:  · Constitutional: no unanticipated weight loss. There's been no change in energy level, sleep pattern, or activity level. No fevers, chills. · Eyes: No visual changes or diplopia. No scleral icterus. · ENT: No Headaches, hearing loss or vertigo. No mouth sores or sore throat. · Cardiovascular: as reviewed in HPI  · Respiratory: No cough or wheezing, no sputum production. No hematemesis. · Gastrointestinal: No abdominal pain, appetite loss, blood in stools. No change in bowel or bladder habits. · Genitourinary: No dysuria, trouble voiding, or hematuria. · Musculoskeletal:  No gait disturbance, no joint complaints. · Integumentary: No rash or pruritis. · Neurological: No headache, diplopia, change in muscle strength, numbness or tingling. · Psychiatric: No anxiety or depression. · Endocrine: No temperature intolerance. No excessive thirst, fluid intake, or urination. No tremor. · Hematologic/Lymphatic: No abnormal bruising or bleeding, blood clots or swollen lymph nodes. · Allergic/Immunologic: No nasal congestion or hives. Physical Exam:   /76 (Site: Right Upper Arm, Position: Sitting, Cuff Size: Medium Adult)   Pulse 66   Ht 5' 10\" (1.778 m)   Wt 203 lb 6.4 oz (92.3 kg)   SpO2 98%   BMI 29.18 kg/m²   Wt Readings from Last 3 Encounters:   09/20/21 203 lb 6.4 oz (92.3 kg)   01/18/21 207 lb (93.9 kg)   01/09/20 210 lb 3.2 oz (95.3 kg)     Constitutional: He is oriented to person, place, and time. He appears well-developed and well-nourished. In no acute distress. Head: Normocephalic and atraumatic. Pupils equal and round. Neck: Neck supple. No JVP or carotid bruit appreciated. No mass and no thyromegaly present. No lymphadenopathy present.   Cardiovascular: Regular, bradycardic. Normal heart sounds. Exam reveals no gallop and no friction rub. No murmur heard. Pulmonary/Chest: Effort normal and breath sounds normal. No respiratory distress. He has no wheezes, rhonchi or rales. Abdominal: Soft, non-tender. Bowel sounds are normal. He exhibits no organomegaly, mass or bruit. Extremities: Mild RLE edema, cyanosis, or clubbing. Pulses are 2+ radial/dorsalis pedis/posterior tibial/carotid bilaterally. Neurological: No gross cranial nerve deficit. Coordination normal.   Skin: Skin is warm and dry. There is no rash or diaphoresis. Psychiatric: He has a normal mood and affect. His speech is normal and behavior is normal.     Lab Review:   FLP:  No results found for: TRIG, HDL, LDLCALC, LDLDIRECT, LABVLDL  BUN/Creatinine:  No results found for: BUN, CREATININE    EKG Interpretation:   EKG 1/2020 SB. EKG 9/20/21 SB. Event recorder 11/2016 NSR, PVC's and PAC's- isolated which are symptomatic. Image Review:   GXT 10/11/16 no ischemia, isolated symptomatic PAC's and PVC's    Assessment/Plan:     Hypertension  Controlled. Continue current medical management. Symptomatic PAC's/PVC's  EKG today confirms SB. Feels better on current dose of B-blocker. Will continue     Hyperlipidemia  Continue statin therapy. LDL 84 7/2020. Recommended yearly lipid profile, checked by PCP per pt. RLE swelling  No clinical evidence of CHF. Likely secondary to venous insufficiency. Advised to f/u with PCP for further evaluation and possibly DVT workup. Encouraged to wear support stockings, limit salt intake and exercise regularly. DM. Managed by PCP    Encouraged to obtain yearly flu vaccination. Follow up in 1 year    Thank you very much for allowing me to participate in the care of your patient. Please do not hesitate to contact me if you have any questions.     Sincerely,  Merary Todd MD      [address]  [facility], [address] [facility]  Ph: [phone]  Fax: [phone]    This note was scribed in the presence of the physician by Kimberlyn Alvarez RN.

## 2021-09-20 ENCOUNTER — OFFICE VISIT (OUTPATIENT)
Dept: CARDIOLOGY CLINIC | Age: 56
End: 2021-09-20
Payer: COMMERCIAL

## 2021-09-20 VITALS
OXYGEN SATURATION: 98 % | BODY MASS INDEX: 29.12 KG/M2 | DIASTOLIC BLOOD PRESSURE: 76 MMHG | SYSTOLIC BLOOD PRESSURE: 124 MMHG | WEIGHT: 203.4 LBS | HEIGHT: 70 IN | HEART RATE: 66 BPM

## 2021-09-20 DIAGNOSIS — E78.00 PURE HYPERCHOLESTEROLEMIA: ICD-10-CM

## 2021-09-20 DIAGNOSIS — I49.3 PVC'S (PREMATURE VENTRICULAR CONTRACTIONS): ICD-10-CM

## 2021-09-20 DIAGNOSIS — I10 ESSENTIAL HYPERTENSION: Primary | ICD-10-CM

## 2021-09-20 DIAGNOSIS — R00.2 PALPITATIONS: ICD-10-CM

## 2021-09-20 DIAGNOSIS — I49.1 PAC (PREMATURE ATRIAL CONTRACTION): ICD-10-CM

## 2021-09-20 PROCEDURE — 99214 OFFICE O/P EST MOD 30 MIN: CPT | Performed by: INTERNAL MEDICINE

## 2021-09-20 PROCEDURE — 93000 ELECTROCARDIOGRAM COMPLETE: CPT | Performed by: INTERNAL MEDICINE

## 2021-09-20 NOTE — PATIENT INSTRUCTIONS
Patient Education        High Cholesterol: Care Instructions  Overview     Cholesterol is a type of fat in your blood. It is needed for many body functions, such as making new cells. Cholesterol is made by your body. It also comes from food you eat. High cholesterol means that you have too much of the fat in your blood. This raises your risk of a heart attack and stroke. LDL and HDL are part of your total cholesterol. LDL is the \"bad\" cholesterol. High LDL can raise your risk for coronary artery disease, heart attack, and stroke. HDL is the \"good\" cholesterol. It helps clear bad cholesterol from the body. High HDL is linked with a lower risk of coronary artery disease, heart attack, and stroke. Your cholesterol levels help your doctor find out your risk for having a heart attack or stroke. You and your doctor can talk about whether you need to lower your risk and what treatment is best for you. Treatment options include a heart-healthy lifestyle and medicine. Both options can help lower your cholesterol and your risk. The way you choose to lower your risk will depend on how high your risk is for heart attack and stroke. It will also depend on how you feel about taking medicines. Follow-up care is a key part of your treatment and safety. Be sure to make and go to all appointments, and call your doctor if you are having problems. It's also a good idea to know your test results and keep a list of the medicines you take. How can you care for yourself at home? · Eat heart-healthy foods. ? Eat fruits, vegetables, whole grains, beans, and other high-fiber foods. ? Eat lean proteins, such as seafood, lean meats, beans, nuts, and soy products. ? Eat healthy fats, such as canola and olive oil. ? Choose foods that are low in saturated fat. ? Limit sodium and alcohol. ? Limit drinks and foods with added sugar. · Be physically active. Try to do moderate activity at least 2½ hours a week.  Or try to do vigorous activity at least 1¼ hours a week. You may want to walk or try other activities, such as running, swimming, cycling, or playing tennis or team sports. · Stay at a healthy weight or lose weight by making the changes in eating and physical activity listed above. Losing just a small amount of weight, even 5 to 10 pounds, can help reduce your risk for having a heart attack or stroke. · Do not smoke. · Manage other health problems. These include diabetes and high blood pressure. If you think you may have a problem with alcohol or drug use, talk to your doctor. · If you take medicine, take it exactly as prescribed. Call your doctor if you think you are having a problem with your medicine. · Check with your doctor or pharmacist before you use any other medicines, including over-the-counter medicines. Make sure your doctor knows all of the medicines, vitamins, herbal products, and supplements you take. Taking some medicines together can cause problems. When should you call for help? Watch closely for changes in your health, and be sure to contact your doctor if:    · You need help making lifestyle changes.     · You have questions about your medicine. Where can you learn more? Go to https://QuixbypeAperio Technologieseb.Attune [email or web address] org and sign in to your [place] account. Enter H613 in the [facility] box to learn more about \"High Cholesterol: Care Instructions. \"     If you do not have an account, please click on the \"Sign Up Now\" link. Current as of: August 31, 2020               Content Version: 12.9  © 2006-2021 Healthwise, [facility]. Care instructions adapted under license by [facility] ([facility]). If you have questions about a medical condition or this instruction, always ask your healthcare professional. [place] [address] any warranty or liability for your use of this information.

## 2022-01-27 RX ORDER — LOSARTAN POTASSIUM 25 MG/1
25 TABLET ORAL DAILY
Qty: 90 TABLET | Refills: 3 | Status: SHIPPED | OUTPATIENT
Start: 2022-01-27

## 2022-01-27 NOTE — TELEPHONE ENCOUNTER
Last ov 9/20/21  Pending appt yearly  Last refill 12/30/20 #90x3  Last labs 7/10/20 cmp in care everywhere

## 2022-03-21 RX ORDER — METOPROLOL SUCCINATE 100 MG/1
100 TABLET, EXTENDED RELEASE ORAL DAILY
Qty: 90 TABLET | Refills: 3 | Status: SHIPPED | OUTPATIENT
Start: 2022-03-21 | End: 2022-06-19

## 2022-03-21 NOTE — TELEPHONE ENCOUNTER
Last OV: 9/20/21  Next OV: 1 yr f/u 9/2022  Last refill: 2/22/21  Most recent Labs: 7/8/19  Last EKG (if needed):9/20/21

## 2023-11-01 RX ORDER — LOSARTAN POTASSIUM 25 MG/1
25 TABLET ORAL DAILY
Qty: 90 TABLET | Refills: 3 | OUTPATIENT
Start: 2023-11-01